# Patient Record
(demographics unavailable — no encounter records)

---

## 2025-01-28 NOTE — HISTORY OF PRESENT ILLNESS
[Sudden] : sudden [7] : 7 [0] : 0 [Dull/Aching] : dull/aching [Intermittent] : intermittent [Meds] : meds [Walking] : walking [de-identified] : 1/28/25: f/u right knee with flare up pain, worse over the past few days.  Localized to posterior knee, increased difficulty bending.  Stiffness and pain with stairs.  Meloxicam 15mg prn.  No hx knee injections. No locking/giving way episodes.   4/15/22: Has on and off pain right knee. Pain is relieved with Meloxicam. Pain is worse going up/down stairs. No clicking, locking or giving way [] : no [FreeTextEntry3] : 11/2021 [FreeTextEntry5] : Pt present with Rt knee pain and swelling. No injury or trauma.\par   [de-identified] : 1 weeks ago [de-identified] : PCP [de-identified] : x-ray

## 2025-01-28 NOTE — IMAGING
[Right] : right knee [AP] : anteroposterior [Lateral] : lateral [Mild tricompartmental OA medial narrowing] : Mild tricompartmental OA medial narrowing

## 2025-01-28 NOTE — DISCUSSION/SUMMARY
[de-identified] : Offered her cortisone injection but she's reluctant. Continue Meloxicam, quad exercises

## 2025-01-28 NOTE — PHYSICAL EXAM
[Right] : right knee [Negative] : negative Gillian's [AP] : anteroposterior [Lateral] : lateral [Outside films reviewed] : Outside films reviewed [There are no fractures, subluxations or dislocations. No significant abnormalities are seen] : There are no fractures, subluxations or dislocations. No significant abnormalities are seen [] : negative Lachmann [[id number]] : flexion 130 degrees [de-identified] : extension 7 degrees